# Patient Record
Sex: MALE | Race: ASIAN | NOT HISPANIC OR LATINO | URBAN - METROPOLITAN AREA
[De-identification: names, ages, dates, MRNs, and addresses within clinical notes are randomized per-mention and may not be internally consistent; named-entity substitution may affect disease eponyms.]

---

## 2017-02-23 ENCOUNTER — EMERGENCY (EMERGENCY)
Facility: HOSPITAL | Age: 22
LOS: 1 days | Discharge: HOME | End: 2017-02-23
Attending: EMERGENCY MEDICINE | Admitting: EMERGENCY MEDICINE
Payer: MEDICAID

## 2017-02-23 VITALS
SYSTOLIC BLOOD PRESSURE: 160 MMHG | HEART RATE: 99 BPM | DIASTOLIC BLOOD PRESSURE: 101 MMHG | TEMPERATURE: 98 F | RESPIRATION RATE: 16 BRPM | OXYGEN SATURATION: 99 %

## 2017-02-23 VITALS — SYSTOLIC BLOOD PRESSURE: 133 MMHG | DIASTOLIC BLOOD PRESSURE: 85 MMHG | HEART RATE: 82 BPM

## 2017-02-23 PROCEDURE — 10080 I&D PILONIDAL CYST SIMPLE: CPT

## 2017-02-23 PROCEDURE — 99283 EMERGENCY DEPT VISIT LOW MDM: CPT

## 2017-02-23 RX ORDER — IBUPROFEN 200 MG
600 TABLET ORAL ONCE
Qty: 0 | Refills: 0 | Status: DISCONTINUED | OUTPATIENT
Start: 2017-02-23 | End: 2017-02-23

## 2017-02-23 RX ADMIN — Medication 300 MILLIGRAM(S): at 18:38

## 2017-02-23 NOTE — ED ADULT NURSE REASSESSMENT NOTE - GENERAL PATIENT STATE
pt in procedure room in QUID area, s/p I&D of abcess. pt states pain is better that arrival, OK with pain level at present./improvement verbalized/comfortable appearance

## 2017-02-23 NOTE — ED PROVIDER NOTE - OBJECTIVE STATEMENT
22yo M with no significant PMHx, presents to ED c/o worsening low back pain for x1wk, low back redness/swelling today. He states pain is worse when sitting. Pt states pain began x1wk ago, he initially took Motrin and used Bengay with temporary relief. He saw his PMD x2d ago, Dx'd with muscle strain, Rx'd ibu and Robaxin which he has been taking with temporary relief of pain, last taken x3hrs ago. Today his mother noticed redness and swelling in the area. 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 He does 4-5 minutes of body weight exercise each night. Denies bladder/bowel incontinence, weakness, recent injury/trauma.

## 2017-02-23 NOTE — ED PROVIDER NOTE - MEDICAL DECISION MAKING DETAILS
Low back pain with finding consistent with pilonidal cyst, will I&D and discharge with general surgery f/u

## 2017-02-23 NOTE — ED PROVIDER NOTE - NS ED MD SCRIBE ATTENDING SCRIBE SECTIONS
DISPOSITION/HIV/HISTORY OF PRESENT ILLNESS/VITAL SIGNS( Pullset)/PHYSICAL EXAM/REVIEW OF SYSTEMS/PAST MEDICAL/SURGICAL/SOCIAL HISTORY

## 2017-02-23 NOTE — ED PROVIDER NOTE - CARE PLAN
Principal Discharge DX:	Pilonidal cyst with abscess Principal Discharge DX:	Pilonidal cyst with abscess  Instructions for follow-up, activity and diet:	PLEASE TAKE ANTIBIOTICS AS PRESCRIBED - YOU WILL BE TAKING BACTRIM DS TWICE DAILY X 7 DAYS, TAKE IBUPROFEN 600MG EVERY 8HRS AS NEEDED FOR THE PAIN. APPLY WARM COMPRESSES TO THE AREA FOR 5-10MIN. RETURN TO ER IN 2 DAYS FOR WOUND CHECK. OR IMMEDIATELY FOR FEVER, INCREASED REDNESS, RED STREAKS EXTENDING FROM THE WOUND. Principal Discharge DX:	Pilonidal cyst with abscess  Instructions for follow-up, activity and diet:	PLEASE TAKE ANTIBIOTICS AS PRESCRIBED - YOU WILL BE TAKING clindamycin 300mg every 6hrs x  DAILY X 7 DAYS, TAKE IBUPROFEN 600MG EVERY 8HRS AS NEEDED FOR THE PAIN, take percocet for severe pain only ( Take Percocet 5/325mg every 6hrs for severe pain (no driving, operting heavy machinery, drinking alcohol, gambling, the medication will make you drowsy)  . APPLY WARM COMPRESSES TO THE AREA FOR 5-10MIN. RETURN TO ER IN 2 DAYS FOR WOUND CHECK. OR IMMEDIATELY FOR FEVER, INCREASED REDNESS, RED STREAKS EXTENDING FROM THE WOUND.

## 2017-02-23 NOTE — ED ADULT TRIAGE NOTE - CHIEF COMPLAINT QUOTE
pt amb to triage c/o lower back/sacral pain x 1 week partially relieved w/ jose, saw PMD 2 days ago given meds w/ no relief, motrin taken before presentation

## 2017-02-23 NOTE — ED PROCEDURE NOTE - DETAILS:
Dr. Hernandes: I personally supervised this procedure performed by the PA and I agree with the above documentation.

## 2017-02-23 NOTE — ED PROVIDER NOTE - PLAN OF CARE
PLEASE TAKE ANTIBIOTICS AS PRESCRIBED - YOU WILL BE TAKING BACTRIM DS TWICE DAILY X 7 DAYS, TAKE IBUPROFEN 600MG EVERY 8HRS AS NEEDED FOR THE PAIN. APPLY WARM COMPRESSES TO THE AREA FOR 5-10MIN. RETURN TO ER IN 2 DAYS FOR WOUND CHECK. OR IMMEDIATELY FOR FEVER, INCREASED REDNESS, RED STREAKS EXTENDING FROM THE WOUND. PLEASE TAKE ANTIBIOTICS AS PRESCRIBED - YOU WILL BE TAKING clindamycin 300mg every 6hrs x  DAILY X 7 DAYS, TAKE IBUPROFEN 600MG EVERY 8HRS AS NEEDED FOR THE PAIN, take percocet for severe pain only ( Take Percocet 5/325mg every 6hrs for severe pain (no driving, operting heavy machinery, drinking alcohol, gambling, the medication will make you drowsy)  . APPLY WARM COMPRESSES TO THE AREA FOR 5-10MIN. RETURN TO ER IN 2 DAYS FOR WOUND CHECK. OR IMMEDIATELY FOR FEVER, INCREASED REDNESS, RED STREAKS EXTENDING FROM THE WOUND.

## 2017-02-23 NOTE — ED PROVIDER NOTE - PROGRESS NOTE DETAILS
NEIL Mcallister RW - received pt s/p Quids eval by Dr cisneros. pt with pilonidal abscess, well appearing afebrile, denies hx of previous abscesses, not immunocompromised. will perform I&D, wound check in 2 days.

## 2017-02-24 LAB
GRAM STN WND: SIGNIFICANT CHANGE UP
SPECIMEN SOURCE: SIGNIFICANT CHANGE UP

## 2017-02-25 ENCOUNTER — EMERGENCY (EMERGENCY)
Facility: HOSPITAL | Age: 22
LOS: 1 days | Discharge: HOME | End: 2017-02-25
Attending: EMERGENCY MEDICINE | Admitting: EMERGENCY MEDICINE

## 2017-02-25 VITALS
HEART RATE: 86 BPM | SYSTOLIC BLOOD PRESSURE: 132 MMHG | DIASTOLIC BLOOD PRESSURE: 79 MMHG | OXYGEN SATURATION: 100 % | RESPIRATION RATE: 18 BRPM | TEMPERATURE: 97 F

## 2017-02-25 LAB — SPECIMEN SOURCE: SIGNIFICANT CHANGE UP

## 2017-02-25 NOTE — ED PROVIDER NOTE - NS ED MD SCRIBE ATTENDING SCRIBE SECTIONS
HIV/DISPOSITION/HISTORY OF PRESENT ILLNESS/VITAL SIGNS( Pullset)/REVIEW OF SYSTEMS/PAST MEDICAL/SURGICAL/SOCIAL HISTORY/PHYSICAL EXAM

## 2017-02-25 NOTE — ED PROVIDER NOTE - OBJECTIVE STATEMENT
20 y/o M with no significant PMHx presents to the ED for a followup. Pt was seen in the ED 2 days ago for lower back pain, was diagnosed with pilonidal cyst, and had an I&D performed. Pt was instructed to return today to followup. Pt has no complaints currently, and has been compliant with medications. Mother helped pt change gauze.

## 2017-02-25 NOTE — ED POST DISCHARGE NOTE - RESULT SUMMARY
WCX: Gram stain WBC, Gram pos cocci in pairs Cx: Gram positive cocci in pairs  to be ID (prelim) Pt treated with Clindamycin

## 2017-02-25 NOTE — ED PROVIDER NOTE - MEDICAL DECISION MAKING DETAILS
20 y/o M presents for followup of I&D done 2 days ago for pilonidal cyst. Cultures showed WBCs gram positive for cocci. Advised to continue Clindamycin and return in 48 hours for repacking.

## 2017-02-26 LAB
-  CEFTRIAXONE: SIGNIFICANT CHANGE UP
-  CLINDAMYCIN: SIGNIFICANT CHANGE UP
-  ERYTHROMYCIN: SIGNIFICANT CHANGE UP
-  PENICILLIN G: SIGNIFICANT CHANGE UP
-  VANCOMYCIN: SIGNIFICANT CHANGE UP
METHOD TYPE: SIGNIFICANT CHANGE UP
ORGANISM # SPEC MICROSCOPIC CNT: SIGNIFICANT CHANGE UP
ORGANISM # SPEC MICROSCOPIC CNT: SIGNIFICANT CHANGE UP

## 2017-02-27 ENCOUNTER — EMERGENCY (EMERGENCY)
Facility: HOSPITAL | Age: 22
LOS: 1 days | Discharge: HOME | End: 2017-02-27
Attending: EMERGENCY MEDICINE | Admitting: EMERGENCY MEDICINE

## 2017-02-27 VITALS
SYSTOLIC BLOOD PRESSURE: 135 MMHG | HEART RATE: 100 BPM | DIASTOLIC BLOOD PRESSURE: 87 MMHG | TEMPERATURE: 98 F | RESPIRATION RATE: 18 BRPM | OXYGEN SATURATION: 99 %

## 2017-02-27 NOTE — ED PROVIDER NOTE - OBJECTIVE STATEMENT
20 yo M pt w/ no significant PMHx presents to the ED for repacking of pilonidal cyst. Pt was seen Thursday (4 days ago) and had I&D done and packed. Returned on Saturday (2 days ago) for repacking and was told to return today. Denies fevers, chills, drainage, pain, dizziness, lightheadedness. Hasn't been taking medication for pain.

## 2018-04-14 ENCOUNTER — EMERGENCY (EMERGENCY)
Facility: HOSPITAL | Age: 23
LOS: 1 days | Discharge: HOME | End: 2018-04-14
Admitting: EMERGENCY MEDICINE
Payer: MEDICAID

## 2018-04-14 VITALS
OXYGEN SATURATION: 100 % | DIASTOLIC BLOOD PRESSURE: 95 MMHG | SYSTOLIC BLOOD PRESSURE: 153 MMHG | TEMPERATURE: 98 F | HEART RATE: 96 BPM | RESPIRATION RATE: 14 BRPM

## 2018-04-14 PROCEDURE — 73564 X-RAY EXAM KNEE 4 OR MORE: CPT | Mod: 26,RT

## 2018-04-14 PROCEDURE — 99283 EMERGENCY DEPT VISIT LOW MDM: CPT

## 2018-04-14 RX ORDER — IBUPROFEN 200 MG
600 TABLET ORAL ONCE
Qty: 0 | Refills: 0 | Status: COMPLETED | OUTPATIENT
Start: 2018-04-14 | End: 2018-04-14

## 2018-04-14 RX ADMIN — Medication 600 MILLIGRAM(S): at 15:20

## 2018-04-14 NOTE — ED PROVIDER NOTE - OBJECTIVE STATEMENT
21 yo M with no sig PMHX presents to the ED c.o right knee pain s/p twisting yesterday at a cricket game. Pt endorses that he was up at bat, swung twisted his right knee and fell backwards 21 yo M with no sig PMHX presents to the ED c/o right knee pain s/p twisting yesterday at a cricket game. Pt endorses that he was up at bat, swung twisted his right knee and fell backwards was able to catch himself on tree that was behind him. Since injury having difficulty ambulating. No blunt trauma or fall on the knee. Pain only with movement and trying to put pressure/ walk on knee. Feels like leg is going to give out. Denies any other complaints of numbness, tingling, swelling abrasions, trauma to head or other extremities.

## 2018-04-14 NOTE — ED PROVIDER NOTE - LOWER EXTREMITY EXAM, RIGHT
FROM passive. pain with motion but able to fully range knee. no joint effusion, no eyrthema, no ecchymosis. minimal click with movement. no TTP. 5/5 strength

## 2018-04-14 NOTE — ED PROCEDURE NOTE - NS ED PERI VASCULAR NEG
fingers/toes warm to touch/no swelling/capillary refill time < 2 seconds/no cyanosis of extremity/no paresthesia

## 2018-04-14 NOTE — ED PROVIDER NOTE - PLAN OF CARE
Follow up with ortho this week.  Referral list provided. or If you have issues obtaining follow up, please call: [phone] to obtain a doctor or specialist who takes your insurance in your area.  Rest, ice and elevate knee.  Ambulate as tolerated with use of crutches.  Keep knee immobilizer on during the day.  Take Motrin 600mg every 8 hrs for pain with food. Worsening pain, swelling, weakness, numbness return to ER.

## 2018-04-14 NOTE — ED PROVIDER NOTE - CARE PLAN
Principal Discharge DX:	Knee sprain  Assessment and plan of treatment:	Follow up with ortho this week.  Referral list provided. or If you have issues obtaining follow up, please call: [phone] to obtain a doctor or specialist who takes your insurance in your area.  Rest, ice and elevate knee.  Ambulate as tolerated with use of crutches.  Keep knee immobilizer on during the day.  Take Motrin 600mg every 8 hrs for pain with food. Worsening pain, swelling, weakness, numbness return to ER.

## 2018-04-14 NOTE — ED ADULT NURSE NOTE - OBJECTIVE STATEMENT
Patient to intake 10c, a&ox3, c/o R knee with ambulation/activity/or bearing weight s/p fall while playing cricket. No swelling or ecchymosis noted to area. Pt to XR. Pain med given as ordered.

## 2020-06-17 NOTE — ED PROVIDER NOTE - MEDICAL DECISION MAKING DETAILS
Toileting/Standing/Walking
22 yo M pt here for wound check. Drained removed. Guaze placed. Resume normal activity. Return instructions given.

## 2023-05-04 NOTE — ED PROVIDER NOTE - NS_EDPROVIDERDISPOUSERTYPE_ED_A_ED
yes
I have personally evaluated and examined the patient. The Attending was available to me as a supervising provider if needed.